# Patient Record
(demographics unavailable — no encounter records)

---

## 2024-10-09 NOTE — END OF VISIT
[FreeTextEntry3] : LORNA Urbano has acted like a scribe on my behalf. I have reviewed the note and edited where appropriate. History, PE, assessment, and plan were personally performed by me.
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
## 2024-10-29 NOTE — HISTORY OF PRESENT ILLNESS
[FreeTextEntry1] : 3 yr 10 months Doing well  h/o developmental delay with last evaluation approximately one yr ago. previously had been receiving services including speech, ot, pt and ming with presumptive dx of ASD mother states that ASD no longer suspected.  notes significant improvement in speech. in  program presently, delay in resuming services.  "agency is looking for therapists"  diet ok off the bottle bowels ok.  not interested in potty training sleeps ok sees a dentist h/o sesame allergy, confirmed recently at allergy.  has EpiPens needs blood work for [facility]

## 2024-10-29 NOTE — PHYSICAL EXAM

## 2024-10-29 NOTE — DISCUSSION/SUMMARY
[FreeTextEntry1] : Healthy almost 4 yr old seasonal influenza vaccine administered CBC and lead for [facility]IC Advised to include local school district in attempt to resume services asap h/o developmental delay, and suspect ASD routine care f/u after 4rth birthday for vaccines.

## 2025-01-22 NOTE — DISCUSSION/SUMMARY
[] : The components of the vaccine(s) to be administered today are listed in the plan of care. The disease(s) for which the vaccine(s) are intended to prevent and the risks have been discussed with the caretaker.  The risks are also included in the appropriate vaccination information statements which have been provided to the patient's caregiver.  The caregiver has given consent to vaccinate. [FreeTextEntry1] : here today for  4 year vaccines, tolerated well RTO in october 2025 for [facility] MOC understood

## 2025-01-22 NOTE — DISCUSSION/SUMMARY
[] : The components of the vaccine(s) to be administered today are listed in the plan of care. The disease(s) for which the vaccine(s) are intended to prevent and the risks have been discussed with the caretaker.  The risks are also included in the appropriate vaccination information statements which have been provided to the patient's caregiver.  The caregiver has given consent to vaccinate. [FreeTextEntry1] : here today for  4 year vaccines, tolerated well RTO in october 2025 for [facility] MOC understood